# Patient Record
Sex: FEMALE | Race: WHITE | NOT HISPANIC OR LATINO | Employment: FULL TIME | URBAN - METROPOLITAN AREA
[De-identification: names, ages, dates, MRNs, and addresses within clinical notes are randomized per-mention and may not be internally consistent; named-entity substitution may affect disease eponyms.]

---

## 2018-07-04 ENCOUNTER — HOSPITAL ENCOUNTER (EMERGENCY)
Facility: HOSPITAL | Age: 53
Discharge: HOME | End: 2018-07-04
Attending: EMERGENCY MEDICINE | Admitting: EMERGENCY MEDICINE

## 2018-07-04 ENCOUNTER — APPOINTMENT (OUTPATIENT)
Dept: GENERAL RADIOLOGY | Facility: HOSPITAL | Age: 53
End: 2018-07-04

## 2018-07-04 VITALS
BODY MASS INDEX: 19.29 KG/M2 | RESPIRATION RATE: 20 BRPM | WEIGHT: 120 LBS | DIASTOLIC BLOOD PRESSURE: 78 MMHG | OXYGEN SATURATION: 96 % | TEMPERATURE: 97.7 F | HEART RATE: 93 BPM | SYSTOLIC BLOOD PRESSURE: 128 MMHG | HEIGHT: 66 IN

## 2018-07-04 DIAGNOSIS — S50.01XA CONTUSION OF RIGHT ELBOW, INITIAL ENCOUNTER: Primary | ICD-10-CM

## 2018-07-04 PROCEDURE — 99283 EMERGENCY DEPT VISIT LOW MDM: CPT

## 2018-07-04 PROCEDURE — 73070 X-RAY EXAM OF ELBOW: CPT

## 2018-07-04 NOTE — ED TRIAGE NOTES
Pt states that she was walking work today and the floor was slick.   States that her feet went out from under her.  Denies LOC or hitting head.   Pt states that her right arm hurts, and her hand is numb   Radial pulse is 2+

## 2018-07-04 NOTE — DISCHARGE INSTRUCTIONS
Apply ice as needed.  Take ibuprofen or Naprosyn as needed for pain.  Return to emergency department for worsening pain, increased swelling, persistent numbness or tingling in your fingers, or other concern.

## 2018-07-04 NOTE — ED PROVIDER NOTES
EMERGENCY DEPARTMENT ENCOUNTER    CHIEF COMPLAINT  Chief Complaint: R elbow pain  History given by: Patient  History limited by: N/A  Room Number: 38/38  PMD: Angela Zabala MD      HPI:  Pt is a 52 y.o. female who presents complaining of R elbow pain s/p a fall which occurred PTA.  Pt reports the floor at work was slippery and caused her to fall, landing on her R elbow. Pt reports movement worsens the pain.  Pt c/o numbness to her R fingers directly after the fall (resolved), and current tingling to her fingertips, but denies shoulder or wrist injury or any other complaints at this time.    Duration:  PTA  Onset: sudden  Timing: constant  Location: R elbow  Radiation: none  Quality: pain  Intensity/Severity: moderate  Progression: unchanged  Associated Symptoms:  numbness to her R fingers directly after the fall (resolved), and current tingling to her fingertips  Aggravating Factors: movement  Alleviating Factors: none stated  Previous Episodes: none  Treatment before arrival: none    PAST MEDICAL HISTORY  Active Ambulatory Problems     Diagnosis Date Noted   • No Active Ambulatory Problems     Resolved Ambulatory Problems     Diagnosis Date Noted   • No Resolved Ambulatory Problems     No Additional Past Medical History       PAST SURGICAL HISTORY  Past Surgical History:   Procedure Laterality Date   • BREAST LUMPECTOMY         FAMILY HISTORY  History reviewed. No pertinent family history.    SOCIAL HISTORY  Social History     Social History   • Marital status:      Spouse name: N/A   • Number of children: N/A   • Years of education: N/A     Occupational History   • Not on file.     Social History Main Topics   • Smoking status: Never Smoker   • Smokeless tobacco: Not on file   • Alcohol use Yes      Comment: occasional    • Drug use: Unknown   • Sexual activity: Not on file     Other Topics Concern   • Not on file     Social History Narrative   • No narrative on file       ALLERGIES  Patient has no  known allergies.    REVIEW OF SYSTEMS  Review of Systems   Constitutional: Negative for fever.   HENT: Negative for sore throat.    Eyes: Negative.    Respiratory: Negative for cough and shortness of breath.    Cardiovascular: Negative for chest pain.   Gastrointestinal: Negative for abdominal pain, diarrhea and vomiting.   Genitourinary: Negative for dysuria.   Musculoskeletal: Positive for arthralgias (R elbow). Negative for neck pain.   Skin: Negative for rash.   Allergic/Immunologic: Negative.    Neurological: Positive for numbness (numbness to her R fingers directly after the fall (resolved), and current tingling to her fingertips). Negative for weakness and headaches.   Hematological: Negative.    Psychiatric/Behavioral: Negative.    All other systems reviewed and are negative.      PHYSICAL EXAM  ED Triage Vitals   Temp Heart Rate Resp BP SpO2   07/04/18 1349 07/04/18 1349 07/04/18 1349 07/04/18 1356 07/04/18 1349   97.7 °F (36.5 °C) 93 20 128/78 96 %      Temp src Heart Rate Source Patient Position BP Location FiO2 (%)   07/04/18 1349 07/04/18 1349 07/04/18 1356 07/04/18 1356 --   Oral Monitor Sitting Left arm        Physical Exam   Constitutional: She is oriented to person, place, and time. No distress.   Eyes: EOM are normal.   Neck: Normal range of motion.   Cardiovascular: Normal rate and regular rhythm.    Pulses:       Radial pulses are 2+ on the right side, and 2+ on the left side.   Normal ulnar pulses.  Normal capillary refill.   Pulmonary/Chest: Effort normal and breath sounds normal. No respiratory distress.   Musculoskeletal:        Right elbow: She exhibits normal range of motion and no swelling. Tenderness (posterior) found.        Right forearm: She exhibits tenderness. She exhibits no swelling.   Neurological: She is alert and oriented to person, place, and time. She has normal sensation and normal strength.   Skin: Skin is warm and dry. Abrasion (small abrasion to R posterior forearm) noted.  There is erythema (mild to R posterior elbow and forearm).   Psychiatric: Affect normal.   Nursing note and vitals reviewed.        RADIOLOGY  XR Elbow 2 View Right          XR R elbow shows there is no evidence for fracture or acute abnormality of the  right elbow. Soft tissues are within normal limits and there is no  definite evidence for hemarthrosis. Further evaluation could be  performed with MRI if there is ongoing symptomatology.    I ordered the above noted radiological studies. Interpreted by radiologist.  Reviewed by me in PACS.       PROCEDURES  Procedures      PROGRESS AND CONSULTS  ED Course as of Jul 04 1442 Wed Jul 04, 2018   1407 Pt reports slipped on floor at work ([employer]) and struck right elbow/forearm area on floor. Tingling in fingers since. Denies any other injury. Exam - RUE with very superfical abrasion to posterior aspect of proximal forearm with mild tenderness. No deformity, full ROM, NVID  [KA]      ED Course User Index  [KA] SHAKIRA Grijalva     1406  Ordered XR R Elbow.    1437  Rechecked pt, who is resting comfortably and in NAD.  Discussed with pt her XR results are unremarkable.  Discussed with pt plan for discharge with f/u to PCP and advised pt to ice the area.  Pt understands and agrees with the plan, all questions answered.        MEDICAL DECISION MAKING  Results were reviewed/discussed with the patient and they were also made aware of online access. Pt also made aware that some labs, such as cultures, will not be resulted during ER visit and follow up with PMD is necessary.     MDM  Number of Diagnoses or Management Options  Contusion of right elbow, initial encounter:      Amount and/or Complexity of Data Reviewed  Tests in the radiology section of CPT®: ordered and reviewed (XR R Elbow shows NAD.)  Independent visualization of images, tracings, or specimens: yes    Patient Progress  Patient progress: stable         DIAGNOSIS  Final diagnoses:   Contusion of right elbow,  initial encounter       DISPOSITION  DISCHARGE    Patient discharged in stable condition.    Reviewed implications of results, diagnosis, meds, responsibility to follow up, warning signs and symptoms of possible worsening, potential complications and reasons to return to ER.    Patient/Family voiced understanding of above instructions.    Discussed plan for discharge, as there is no emergent indication for admission. Patient referred to primary care provider for BP management due to today's BP. Pt/family is agreeable and understands need for follow up and repeat testing.  Pt is aware that discharge does not mean that nothing is wrong but it indicates no emergency is present that requires admission and they must continue care with follow-up as given below or physician of their choice.     FOLLOW-UP  Angela Zabala MD  [address] [id number] [phone]      As needed         Medication List      No changes were made to your prescriptions during this visit.           Latest Documented Vital Signs:  As of 2:42 PM  BP- 128/78 HR- 93 Temp- 97.7 °F (36.5 °C) (Oral) O2 sat- 96%    --  Documentation assistance provided by juan jose Bach for Dr. Frazier.  Information recorded by the scribe was done at my direction and has been verified and validated by me.     Brooklynn Bach  07/04/18 1442       Brooklynn Bach  07/04/18 1500       Han Frazier MD  07/04/18 [id number]

## 2018-07-04 NOTE — ED TRIAGE NOTES
Pt slipped and fell while at work landing on her right elbow approx three hours PTA. Pt reports some mild numbness in right hand since fall.

## 2021-03-26 ENCOUNTER — BULK ORDERING (OUTPATIENT)
Dept: CASE MANAGEMENT | Facility: OTHER | Age: 56
End: 2021-03-26

## 2021-03-26 DIAGNOSIS — Z23 IMMUNIZATION DUE: ICD-10-CM
